# Patient Record
(demographics unavailable — no encounter records)

---

## 2025-01-23 NOTE — PROCEDURE
[Risks] : risks [Benefits] : benefits [Alternatives] : alternatives [Patient] : patient [Infection] : infection [Bleeding] : bleeding [Allergic Reaction] : allergic reaction [No Premedication] : no premedication [Pap Performed] : pap not performed [SCI Fully Visualized] : SCI not fully visualized [ECC Performed] : ECC performed [No Abnormalities] : no abnormalities [Biopsy] : biopsy not taken [Hemostasis Obtained] : Hemostasis obtained [Tolerated Well] : the patient tolerated the procedure well [de-identified] : 2 [de-identified] : Difficult visualization of cx due to a deep vagina. T-zone was not seen [de-identified] : 6 & 12:00 [de-identified] : Exocx [de-identified] : Adela [de-identified] : Findings d/w pt. No evidence of SHRUTHI - Path pending. No tampons, intercourse for 1 week.

## 2025-01-23 NOTE — PROCEDURE
[Risks] : risks [Benefits] : benefits [Alternatives] : alternatives [Patient] : patient [Infection] : infection [Bleeding] : bleeding [Allergic Reaction] : allergic reaction [No Premedication] : no premedication [Pap Performed] : pap not performed [SCI Fully Visualized] : SCI not fully visualized [ECC Performed] : ECC performed [No Abnormalities] : no abnormalities [Biopsy] : biopsy not taken [Hemostasis Obtained] : Hemostasis obtained [Tolerated Well] : the patient tolerated the procedure well [de-identified] : Difficult visualization of cx due to a deep vagina. T-zone was not seen [de-identified] : 2 [de-identified] : 6 & 12:00 [de-identified] : Exocx [de-identified] : Adela [de-identified] : Findings d/w pt. No evidence of SHRUTHI - Path pending. No tampons, intercourse for 1 week.

## 2025-02-08 NOTE — HISTORY OF PRESENT ILLNESS
[FreeTextEntry1] : 44yo P0  LMP 2/6/25 referred for new diagnosis of cervical adenocarcinoma. Patient reports a 10yr lapse in GYN care. States prior to recent GYN visit she believes last pap was WNL 10yrs ago. She admits to multiple sexual partners for part of this lapsed time period but over the last 6yrs has been in a monogamous relationship and is celibate. She has had HIV testing over the past year or two and reports WNL. She has no desire for fertility. Recent pap with HSIL/HPV16+, colpo with biopsies reveals adenocarcinoma with clear cell features. patient denies n/v/fever/bleeding/bloating. Reports normal urination and BMs. she was recently hospitalized multiple times at [facility] for complications following LSC Choley for acute on chronic cholecystitis 8/30/24.    PMHx: none PSHx: LSC choley 8/30/25  OBGYNHx: nullip, no hx of fibroids/cysts/abn paps/stis FamHx:Mother colon ca, DOD. no gyn ca, no breast ca SocHx: former heavy smoker. quit 15yrs ago All:none  11/11/24: pap = HSIL, HPV16+  Path: 1/23/25: cervical bx:  Final Diagnosis 1.  Cervix, biopsy      -   Adenocarcinoma with clear cell features.  See note 2.  Endocervical curettings      -   Adenocarcinoma with clear cell features.  See note Note: Part 1 and 2; immunohistochemical stain results of the tumor cells: P16 patchy positive, Napsin positive, P53 wild-type, P40 and ER negative The adenocarcinoma is present as small detached fragments with some stromal tissue in the endocervical curettings and cervical biopsy, with focal involvement of the epithelial surface of the endocervix. The origin of the adenocarcinoma cannot be determined with certainty whether from the cervix or endometrium with this limited materials   Imaging: pending

## 2025-02-08 NOTE — DISCUSSION/SUMMARY
[Reviewed Clinical Lab Test(s)] : Results of clinical tests were reviewed. [Discuss Alternatives/Risks/Benefits w/Patient] : All alternatives, risks, and benefits were discussed with the patient/family and all questions were answered.  Patient expressed good understanding and appreciates the importance of follow up as recommended. [Visit Time ___ Minutes] : [unfilled] minutes [Face to Face Time___ Minutes] : with [unfilled] minutes in face to face consultation. [FreeTextEntry1] : 42yo P0 w/ biopsy proven adenocarcinoma of the cervix. Clinical exam with no gross lesion. For further evaluation and treatment planning. -more than 50% of visit spent face to face with patient reviewing records and interpreting imaging/path/lab results, counseling and coordinating care with high level complexity -I reviewed diagnosis and natural hx of this condition. I reviewed association with HPV 16 and aggressive nature of this HPV strain. I reviewed standard of care treatment of cervical cancer and that more information is needed to guide treatment plan. I explained that treatment involves either surgical mgmt or mgmt with chemoRT. I recommended pelvic MRI and PETCT to assess extent of disease and then based on the findings we can then finalize treatment plan. all questions answered and patient expressed understanding -pelvic MRI, ordered by GYN, will f/u auth and scheduling -PETCT -pt started HPV vaccine series with GYN 1/2025. Intends to complete vaccination series. I explained it will not alter course of cancer diagnosis and treatment and may help suppress HPV16 and will protect her from other HPV strains. -televisit 1wk for results review and treatment planning -pain/fever/bleeding precautions given

## 2025-02-08 NOTE — PHYSICAL EXAM
[Chaperone Present] : A chaperone was present in the examining room during all aspects of the physical examination [75075] : A chaperone was present during the pelvic exam. [Fully active, able to carry on all pre-disease performance without restriction] : Status 0 - Fully active, able to carry on all pre-disease performance without restriction

## 2025-02-08 NOTE — PHYSICAL EXAM
[Chaperone Present] : A chaperone was present in the examining room during all aspects of the physical examination [98561] : A chaperone was present during the pelvic exam. [Fully active, able to carry on all pre-disease performance without restriction] : Status 0 - Fully active, able to carry on all pre-disease performance without restriction

## 2025-02-08 NOTE — HISTORY OF PRESENT ILLNESS
[FreeTextEntry1] : 42yo P0  LMP 2/6/25 referred for new diagnosis of cervical adenocarcinoma. Patient reports a 10yr lapse in GYN care. States prior to recent GYN visit she believes last pap was WNL 10yrs ago. She admits to multiple sexual partners for part of this lapsed time period but over the last 6yrs has been in a monogamous relationship and is celibate. She has had HIV testing over the past year or two and reports WNL. She has no desire for fertility. Recent pap with HSIL/HPV16+, colpo with biopsies reveals adenocarcinoma with clear cell features. patient denies n/v/fever/bleeding/bloating. Reports normal urination and BMs. she was recently hospitalized multiple times at [facility] for complications following LSC Choley for acute on chronic cholecystitis 8/30/24.    PMHx: none PSHx: LSC choley 8/30/25  OBGYNHx: nullip, no hx of fibroids/cysts/abn paps/stis FamHx:Mother colon ca, DOD. no gyn ca, no breast ca SocHx: former heavy smoker. quit 15yrs ago All:none  11/11/24: pap = HSIL, HPV16+  Path: 1/23/25: cervical bx:  Final Diagnosis 1.  Cervix, biopsy      -   Adenocarcinoma with clear cell features.  See note 2.  Endocervical curettings      -   Adenocarcinoma with clear cell features.  See note Note: Part 1 and 2; immunohistochemical stain results of the tumor cells: P16 patchy positive, Napsin positive, P53 wild-type, P40 and ER negative The adenocarcinoma is present as small detached fragments with some stromal tissue in the endocervical curettings and cervical biopsy, with focal involvement of the epithelial surface of the endocervix. The origin of the adenocarcinoma cannot be determined with certainty whether from the cervix or endometrium with this limited materials   Imaging: pending

## 2025-03-10 NOTE — ASSESSMENT
[FreeTextEntry1] : 44yo with newly diagnosed adenocarcinoma with clear cell features on cervical biopsy, unable to determine if cervical or endometrial in origin. MRI showed no abnormal lesions, and PET showed an FDG avid right external iliac lymph node with a negative biopsy.  We discussed that the next step would be to perform a CKC and D&C in order to obtain more tissue and definitively know whether this is cervical or endometrial in origin. In the setting of cervical adenocarcinoma, a CKC would also provide further information including presence/absence of LVSI and depth of invasion. We explained the importance of clarifying this, as the management options would differ (laparoscopic simple hysterectomy with SLNBx vs. open modified radical hysterectomy).   The risks and benefits of CKC were discussed which include, but are not limited to: bleeding, infection, cervical stenosis, cervical insufficiency. Possibility of needing a repeat procedure or hysterectomy was also discussed.   [] CKC, D&C at [facility] (concern for bleeding given cancer diagnosis) [x] Pre-op labs done 02/28/25 [] Medical clearance with pre-op testing in [place] [] Slide review [x] Review MRI images with [facility] radiology: Dr. Self agrees with report

## 2025-03-10 NOTE — ASSESSMENT
[FreeTextEntry1] : 42yo with newly diagnosed adenocarcinoma with clear cell features on cervical biopsy, unable to determine if cervical or endometrial in origin. MRI showed no abnormal lesions, and PET showed an FDG avid right external iliac lymph node with a negative biopsy.  We discussed that the next step would be to perform a CKC and D&C in order to obtain more tissue and definitively know whether this is cervical or endometrial in origin. In the setting of cervical adenocarcinoma, a CKC would also provide further information including presence/absence of LVSI and depth of invasion. We explained the importance of clarifying this, as the management options would differ (laparoscopic simple hysterectomy with SLNBx vs. open modified radical hysterectomy).   The risks and benefits of CKC were discussed which include, but are not limited to: bleeding, infection, cervical stenosis, cervical insufficiency. Possibility of needing a repeat procedure or hysterectomy was also discussed.   [] CKC, D&C at [facility] (concern for bleeding given cancer diagnosis) [x] Pre-op labs done 02/28/25 [] Medical clearance with pre-op testing in [place] [] Slide review [x] Review MRI images with [facility] radiology: Dr. Self agrees with report

## 2025-03-10 NOTE — REASON FOR VISIT
[FreeTextEntry1] : Follow-up  42yo w/ cervical cancer here for presurgical discussion. Feels well, denies pain or bleeding. LMP 03/02/25, menses regular. Not interested in future fertility. Had routine Pap smear showing HSIL HPV 16+, underwent colposcopic biopsies showing adneocarcinoma with clear cell features.   History: ObHx: Nulliparous GynHx: -Pap HSIL HPV 16 + 11/2024, previous Pap normal >10 years ago PMH: Denies PSH: Laparoscopic cholecystectomy 08/2024 Meds: Aspirin Allergies: NKDA SH: Ex-cigarette smoker, stopped 15 years. Denies alcohol, or illicit drug use. Lives with boyfriend. Not currently employed. FMH: Mother colon cancer late 50s.   Surveillance: Last mammogram: 2024 normal Last colonoscopy: 2024 normal

## 2025-03-10 NOTE — REASON FOR VISIT
[FreeTextEntry1] : Follow-up  44yo w/ cervical cancer here for presurgical discussion. Feels well, denies pain or bleeding. LMP 03/02/25, menses regular. Not interested in future fertility. Had routine Pap smear showing HSIL HPV 16+, underwent colposcopic biopsies showing adneocarcinoma with clear cell features.   History: ObHx: Nulliparous GynHx: -Pap HSIL HPV 16 + 11/2024, previous Pap normal >10 years ago PMH: Denies PSH: Laparoscopic cholecystectomy 08/2024 Meds: Aspirin Allergies: NKDA SH: Ex-cigarette smoker, stopped 15 years. Denies alcohol, or illicit drug use. Lives with boyfriend. Not currently employed. FMH: Mother colon cancer late 50s.   Surveillance: Last mammogram: 2024 normal Last colonoscopy: 2024 normal

## 2025-03-10 NOTE — HISTORY OF PRESENT ILLNESS
[FreeTextEntry1] : Problem: 1) Cervical adenocarcinoma   Previous Therapies: 11/11/24: pap = HSIL, HPV16+ 1/23/25: cervical bx, ECC:  Final Diagnosis 1.  Cervix, biopsy      -   Adenocarcinoma with clear cell features.  See note 2.  Endocervical curettings      -   Adenocarcinoma with clear cell features.  See note Note: Part 1 and 2; immunohistochemical stain results of the tumor cells: P16 patchy positive, Napsin positive, P53 wild-type, P40 and ER negative The adenocarcinoma is present as small detached fragments with some stromal tissue in the endocervical curettings and cervical biopsy, with focal involvement of the epithelial surface of the endocervix. The origin of the adenocarcinoma cannot be determined with certainty whether from the cervix or endometrium with this limited materials   2/14/25 Pelvic MRI              a) *  No cervical mass visualized at MRI. No evidence of parametrial invasion.              b) *  No pelvic lymphadenopathy.              c) *  Septate uterus.  2/14/25 PETCT              a) Hypermetabolic RIGHT external iliac node (1.2 x 1.1 cm, SUV 3.5)              b) Focus of uptake in the center of the cervix (SUV 6.5)  2/28/25 IR Biopsy of right inguinal LN             a) - Reactive lymph node with follicular hyperplasia             b) - Negative for lymphoma or metastatic carcinoma

## 2025-03-10 NOTE — PHYSICAL EXAM
[Chaperone Present] : A chaperone was present in the examining room during all aspects of the physical examination [Normal] : Bimanual Exam: Normal [Fully active, able to carry on all pre-disease performance without restriction] : Status 0 - Fully active, able to carry on all pre-disease performance without restriction [FreeTextEntry2] : Rosangeles Dylan

## 2025-03-10 NOTE — ASSESSMENT
[FreeTextEntry1] : 44yo with newly diagnosed adenocarcinoma with clear cell features on cervical biopsy, unable to determine if cervical or endometrial in origin. MRI showed no abnormal lesions, and PET showed an FDG avid right external iliac lymph node with a negative biopsy.  We discussed that the next step would be to perform a CKC and D&C in order to obtain more tissue and definitively know whether this is cervical or endometrial in origin. In the setting of cervical adenocarcinoma, a CKC would also provide further information including presence/absence of LVSI and depth of invasion. We explained the importance of clarifying this, as the management options would differ (laparoscopic simple hysterectomy with SLNBx vs. open modified radical hysterectomy).   The risks and benefits of CKC were discussed which include, but are not limited to: bleeding, infection, cervical stenosis, cervical insufficiency. Possibility of needing a repeat procedure or hysterectomy was also discussed.   [] CKC, D&C at [facility] (concern for bleeding given cancer diagnosis) [x] Pre-op labs done 02/28/25 [] Medical clearance with pre-op testing in [place] [] Slide review [x] Review MRI images with [facility] radiology: Dr. Self agrees with report

## 2025-03-13 NOTE — PHYSICAL EXAM
[No Acute Distress] : no acute distress [Well Nourished] : well nourished [Well Developed] : well developed [Well-Appearing] : well-appearing [Normal Sclera/Conjunctiva] : normal sclera/conjunctiva [PERRL] : pupils equal round and reactive to light [EOMI] : extraocular movements intact [Normal Outer Ear/Nose] : the outer ears and nose were normal in appearance [Normal Oropharynx] : the oropharynx was normal [No JVD] : no jugular venous distention [No Lymphadenopathy] : no lymphadenopathy [Supple] : supple [Thyroid Normal, No Nodules] : the thyroid was normal and there were no nodules present [No Respiratory Distress] : no respiratory distress  [No Accessory Muscle Use] : no accessory muscle use [Clear to Auscultation] : lungs were clear to auscultation bilaterally [Normal Rate] : normal rate  [Regular Rhythm] : with a regular rhythm [Normal S1, S2] : normal S1 and S2 [No Murmur] : no murmur heard [Soft] : abdomen soft [Non Tender] : non-tender [Non-distended] : non-distended [No Masses] : no abdominal mass palpated [No HSM] : no HSM [Normal Bowel Sounds] : normal bowel sounds [Normal Posterior Cervical Nodes] : no posterior cervical lymphadenopathy [Normal Anterior Cervical Nodes] : no anterior cervical lymphadenopathy [No Joint Swelling] : no joint swelling [Grossly Normal Strength/Tone] : grossly normal strength/tone [No Rash] : no rash [Coordination Grossly Intact] : coordination grossly intact [No Focal Deficits] : no focal deficits [Normal Gait] : normal gait [Normal Affect] : the affect was normal [Normal Insight/Judgement] : insight and judgment were intact [Normal TMs] : both tympanic membranes were normal

## 2025-03-17 NOTE — HISTORY OF PRESENT ILLNESS
[No Pertinent Cardiac History] : no history of aortic stenosis, atrial fibrillation, coronary artery disease, recent myocardial infarction, or implantable device/pacemaker [No Pertinent Pulmonary History] : no history of asthma, COPD, sleep apnea, or smoking [No Adverse Anesthesia Reaction] : no adverse anesthesia reaction in self or family member [(Patient denies any chest pain, claudication, dyspnea on exertion, orthopnea, palpitations or syncope)] : Patient denies any chest pain, claudication, dyspnea on exertion, orthopnea, palpitations or syncope [Moderate (4-6 METs)] : Moderate (4-6 METs) [Smoker] : not a smoker [FreeTextEntry1] : cone biopsy [FreeTextEntry2] : 3/20/25 [FreeTextEntry3] : Dr. Britt Koenig [FreeTextEntry4] : Pt is a 42 yo F presenting for pre op examination  Pt has hx of cervical adenocarcinoma  Cervical cone bx upcoming  Previous surgery includes gallbladder surgery Past surgical complications: None  Past anesthetic complications: None Any personal or family history of bleeding problems: none  [FreeTextEntry5] : Patient was a former smoker however quit many years ago

## 2025-03-17 NOTE — HISTORY OF PRESENT ILLNESS
[No Pertinent Cardiac History] : no history of aortic stenosis, atrial fibrillation, coronary artery disease, recent myocardial infarction, or implantable device/pacemaker [No Pertinent Pulmonary History] : no history of asthma, COPD, sleep apnea, or smoking [No Adverse Anesthesia Reaction] : no adverse anesthesia reaction in self or family member [(Patient denies any chest pain, claudication, dyspnea on exertion, orthopnea, palpitations or syncope)] : Patient denies any chest pain, claudication, dyspnea on exertion, orthopnea, palpitations or syncope [Moderate (4-6 METs)] : Moderate (4-6 METs) [Smoker] : not a smoker [FreeTextEntry1] : cone biopsy [FreeTextEntry2] : 3/20/25 [FreeTextEntry3] : Dr. Britt Koenig [FreeTextEntry4] : Pt is a 44 yo F presenting for pre op examination  Pt has hx of cervical adenocarcinoma  Cervical cone bx upcoming  Previous surgery includes gallbladder surgery Past surgical complications: None  Past anesthetic complications: None Any personal or family history of bleeding problems: none  [FreeTextEntry5] : Patient was a former smoker however quit many years ago

## 2025-03-17 NOTE — ADDENDUM
[FreeTextEntry1] : Reviewed lab results discussed also with surgeon.  Patient is medically optimized to undergo cone biopsy procedure.

## 2025-04-22 NOTE — ASSESSMENT
[FreeTextEntry1] : I discussed with the patient with the aid of diagrams, reviewed the findings on history and physical examination, pathology results and reviewed available imaging studies in detail. 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  The management of women with adenocarcinoma in situ (AIS) of the cervix is challenging. Because of the pattern of disease distribution of AIS (multifocal, high in the endocervical canal, inside endocervical clefts), negative margins on a cone biopsy specimen or a negative endocervical curettage do not necessarily ensure that the lesion has been completely excised. This also makes it difficult to detect persistent/recurrent disease or adenocarcinoma. For the reason, NCCN guidelines recommend hysterectomy for patients who have completed childbearing. For patients desiring fertility, conization followed by close surveillance is acceptable. It was explained that women treated with conization alone have a high risk of residual AIS or adenocarcinoma, while the incidence of adenocarcinoma of the vagina after hysterectomy is limited to rare case reports.  The best available data regarding the risk of residual AIS or adenocarcinoma following conization are from a meta-analysis of 33 observational studies including 1278 patients who were followed for an average of three years (range, one to five years) [6]. Among 60 patients who underwent subsequent conization or hysterectomy, the rates of residual AIS and adenocarcinoma were: initial conization margin positive (AIS: 52.8 percent; adenocarcinoma: 6.3 percent [17 of 266]) and initial conization negative (20.3 percent; 1.5 percent [4 of 341]). Adenocarcinoma was diagnosed in an additional eight women (six with positive margins; two with negative margins) who were managed conservatively. The clinical importance of residual AIS is that it is likely to develop into invasive disease. However, progression may take five or more years and long-term data are lacking  Given these considerations, for women with cervical AIS who do not wish to preserve fertility, I recommend cervical conization followed in 8-12 weeks by a simple hysterectomy rather than cervical conization alone. In cases of non-invasive tumor, or invasive stage IA1 disease a simple hysterectomy via a minimally invasive approach can be offered. Preservation of the ovaries in younger women is appropriate, however removal of the fallopian tubes is recommended.  After discussion, the patient states she does not desire future fertility and would like to pursue definitive management of AIS with a hysterectomy and bilateral salpingectomy.  Complications that include, but are not limited to: bleeding, infection, injury to other organs including bowel, bladder, ureters, blood vessels, nerves, infections, blood clots, lymphedema, pneumonia, wound complications and prolonged hospital stay have all been discussed with the patient. I have also provided her with the diagrams  [] [facility] [] Medical clearance from 03/13/25 to be updated for a hysterectomy [] Pre-op labs done 03/13/25

## 2025-04-22 NOTE — PHYSICAL EXAM
[Chaperone Present] : A chaperone was present in the examining room during all aspects of the physical examination [Normal] : Bimanual Exam: Normal [FreeTextEntry2] : Rosangeles Dylan [de-identified] : cervix well-healed

## 2025-04-22 NOTE — PHYSICAL EXAM
[Chaperone Present] : A chaperone was present in the examining room during all aspects of the physical examination [Normal] : Bimanual Exam: Normal [FreeTextEntry2] : Rosangeles Dylan [de-identified] : cervix well-healed

## 2025-04-22 NOTE — ASSESSMENT
[FreeTextEntry1] : I discussed with the patient with the aid of diagrams, reviewed the findings on history and physical examination, pathology results and reviewed available imaging studies in detail. 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  The management of women with adenocarcinoma in situ (AIS) of the cervix is challenging. Because of the pattern of disease distribution of AIS (multifocal, high in the endocervical canal, inside endocervical clefts), negative margins on a cone biopsy specimen or a negative endocervical curettage do not necessarily ensure that the lesion has been completely excised. This also makes it difficult to detect persistent/recurrent disease or adenocarcinoma. For the reason, NCCN guidelines recommend hysterectomy for patients who have completed childbearing. For patients desiring fertility, conization followed by close surveillance is acceptable. It was explained that women treated with conization alone have a high risk of residual AIS or adenocarcinoma, while the incidence of adenocarcinoma of the vagina after hysterectomy is limited to rare case reports.  The best available data regarding the risk of residual AIS or adenocarcinoma following conization are from a meta-analysis of 33 observational studies including 1278 patients who were followed for an average of three years (range, one to five years) [6]. Among 60 patients who underwent subsequent conization or hysterectomy, the rates of residual AIS and adenocarcinoma were: initial conization margin positive (AIS: 52.8 percent; adenocarcinoma: 6.3 percent [17 of 266]) and initial conization negative (20.3 percent; 1.5 percent [4 of 341]). Adenocarcinoma was diagnosed in an additional eight women (six with positive margins; two with negative margins) who were managed conservatively. The clinical importance of residual AIS is that it is likely to develop into invasive disease. However, progression may take five or more years and long-term data are lacking  Given these considerations, for women with cervical AIS who do not wish to preserve fertility, I recommend cervical conization followed in 8-12 weeks by a simple hysterectomy rather than cervical conization alone. In cases of non-invasive tumor, or invasive stage IA1 disease a simple hysterectomy via a minimally invasive approach can be offered. Preservation of the ovaries in younger women is appropriate, however removal of the fallopian tubes is recommended.  After discussion, the patient states she does not desire future fertility and would like to pursue definitive management of AIS with a hysterectomy and bilateral salpingectomy.  Complications that include, but are not limited to: bleeding, infection, injury to other organs including bowel, bladder, ureters, blood vessels, nerves, infections, blood clots, lymphedema, pneumonia, wound complications and prolonged hospital stay have all been discussed with the patient. I have also provided her with the diagrams  [] [facility] BS [] Medical clearance from 03/13/25 to be updated for a hysterectomy [] Pre-op labs done 03/13/25

## 2025-04-22 NOTE — HISTORY OF PRESENT ILLNESS
[FreeTextEntry1] : Problem: 1) Cervical AIS   Previous Therapies: 11/11/24: pap = HSIL, HPV16+ 1/23/25: cervical bx, ECC:  Final Diagnosis 1.  Cervix, biopsy      -   Adenocarcinoma with clear cell features.  See note 2.  Endocervical curettings      -   Adenocarcinoma with clear cell features.  See note Note: Part 1 and 2; immunohistochemical stain results of the tumor cells: P16 patchy positive, Napsin positive, P53 wild-type, P40 and ER negative The adenocarcinoma is present as small detached fragments with some stromal tissue in the endocervical curettings and cervical biopsy, with focal involvement of the epithelial surface of the endocervix. The origin of the adenocarcinoma cannot be determined with certainty whether from the cervix or endometrium with this limited materials   2/14/25 Pelvic MRI              a) *  No cervical mass visualized at MRI. No evidence of parametrial invasion.              b) *  No pelvic lymphadenopathy.              c) *  Septate uterus.  2/14/25 PETCT              a) Hypermetabolic RIGHT external iliac node (1.2 x 1.1 cm, SUV 3.5)              b) Focus of uptake in the center of the cervix (SUV 6.5)  2/28/25 IR Biopsy of right inguinal LN             a) - Reactive lymph node with follicular hyperplasia             b) - Negative for lymphoma or metastatic carcinoma  1) CKC 3/20/25     a) AIS with positive endocervical margins (clear cell features, but not clear cell carcinoma)

## 2025-04-22 NOTE — REASON FOR VISIT
[FreeTextEntry1] : Postop  44yo w/ cervical cancer s/p [facility] here for postop. Patient feels well, denies any issues after the procedure. Had her menses 1 week afterwards with associated lower pelvic cramping. Denies pain today.   History: ObHx: Nulliparous GynHx: -Pap HSIL HPV 16 + 11/2024, previous Pap normal >10 years ago PMH: Denies PSH: Laparoscopic cholecystectomy 08/2024 Meds: Aspirin Allergies: NKDA SH: Ex-cigarette smoker, stopped 15 years. Denies alcohol, or illicit drug use. Lives with boyfriend. Not currently employed. FMH: Mother colon cancer late 50s.   Surveillance: Last mammogram: 2024 normal Last colonoscopy: 2024 normal

## 2025-04-22 NOTE — REASON FOR VISIT
[FreeTextEntry1] : Postop  42yo w/ cervical cancer s/p [facility] here for postop. Patient feels well, denies any issues after the procedure. Had her menses 1 week afterwards with associated lower pelvic cramping. Denies pain today.   History: ObHx: Nulliparous GynHx: -Pap HSIL HPV 16 + 11/2024, previous Pap normal >10 years ago PMH: Denies PSH: Laparoscopic cholecystectomy 08/2024 Meds: Aspirin Allergies: NKDA SH: Ex-cigarette smoker, stopped 15 years. Denies alcohol, or illicit drug use. Lives with boyfriend. Not currently employed. FMH: Mother colon cancer late 50s.   Surveillance: Last mammogram: 2024 normal Last colonoscopy: 2024 normal

## 2025-05-08 NOTE — REASON FOR VISIT
[Other Location: e.g. School (Enter Location, City,State)___] : at [unfilled], at the time of the visit. [Medical Office: ([facility])___] : at the medical office located in  [Telehealth (audio & video)] : This visit was provided via telehealth using real-time 2-way audio visual technology. [Verbal consent obtained from patient] : the patient, [unfilled] [FreeTextEntry1] : Medical Clearance Update   44yo w/ cervical cancer s/p [facility] here for update of medical clearance for [facility] BS 5/15/25 for definitive management of AIS.   History: ObHx: Nulliparous GynHx: -Pap HSIL HPV 16 + 11/2024, previous Pap normal >10 years ago PMH: Denies PSH: Laparoscopic cholecystectomy 08/2024 Meds: Aspirin Allergies: NKDA SH: Ex-cigarette smoker, stopped 15 years. Denies alcohol, or illicit drug use. Lives with boyfriend. Not currently employed. FMH: Mother colon cancer late 50s.   Surveillance: Last mammogram: 2024 normal Last colonoscopy: 2024 normal

## 2025-05-08 NOTE — ASSESSMENT
[FreeTextEntry1] : Patient was scheduled today to discuss her upcoming surgery.  -	Indications for the surgery and risks, alternative and benefits were once again discussed in detail. She had an opportunity to ask questions. -	Her medical clearance form was reviewed and found to be appropriate: clearance from March was low-risk for surgery. She has had no significant interval changes in her health and is therefore considered medically optimized for her procedure -	Her current medications were reviewed, and no medications need to be held pre-operatively.  -	Labs were reviewed. -	Pre-operative imaging: MRI and PET reviewed -	Pathology review: not needed  The pre-operative booklet was reviewed so that she would understand where to find important instructions for the day before and the day of surgery.

## 2025-05-08 NOTE — REASON FOR VISIT
[Other Location: e.g. School (Enter Location, City,State)___] : at [unfilled], at the time of the visit. [Medical Office: ([facility])___] : at the medical office located in  [Telehealth (audio & video)] : This visit was provided via telehealth using real-time 2-way audio visual technology. [Verbal consent obtained from patient] : the patient, [unfilled] [FreeTextEntry1] : Medical Clearance Update   44yo w/ cervical cancer s/p [facility] here for update of medical clearance for [facility] BS 5/15/25 for definitive management of AIS.   History: ObHx: Nulliparous GynHx: -Pap HSIL HPV 16 + 11/2024, previous Pap normal >10 years ago PMH: Denies PSH: Laparoscopic cholecystectomy 08/2024 Meds: Aspirin Allergies: NKDA SH: Ex-cigarette smoker, stopped 15 years. Denies alcohol, or illicit drug use. Lives with boyfriend. Not currently employed. FMH: Mother colon cancer late 50s.   Surveillance: Last mammogram: 2024 normal Last colonoscopy: 2024 normal

## 2025-05-08 NOTE — HISTORY OF PRESENT ILLNESS
[FreeTextEntry1] : Problem: 1) Cervical AIS   Previous Therapies: 11/11/24: pap = HSIL, HPV16+ 1/23/25: cervical bx, ECC:  Final Diagnosis 1.  Cervix, biopsy      -   Adenocarcinoma with clear cell features.  See note 2.  Endocervical curettings      -   Adenocarcinoma with clear cell features.  See note Note: Part 1 and 2; immunohistochemical stain results of the tumor cells: P16 patchy positive, Napsin positive, P53 wild-type, P40 and ER negative The adenocarcinoma is present as small detached fragments with some stromal tissue in the endocervical curettings and cervical biopsy, with focal involvement of the epithelial surface of the endocervix. The origin of the adenocarcinoma cannot be determined with certainty whether from the cervix or endometrium with this limited materials   2/14/25 Pelvic MRI              a) *  No cervical mass visualized at MRI. No evidence of parametrial invasion.              b) *  No pelvic lymphadenopathy.              c) *  Septate uterus.  2/14/25 PETCT              a) Hypermetabolic RIGHT external iliac node (1.2 x 1.1 cm, SUV 3.5)              b) Focus of uptake in the center of the cervix (SUV 6.5)  2/28/25 IR Biopsy of right inguinal LN             a) - Reactive lymph node with follicular hyperplasia             b) - Negative for lymphoma or metastatic carcinoma CKC 3/20/25     a) AIS with positive endocervical margins (clear cell features, but not clear cell carcinoma)

## 2025-05-22 NOTE — HISTORY OF PRESENT ILLNESS
[FreeTextEntry1] : Problem: 1) Cervical AIS   Previous Therapies: 11/11/24: pap = HSIL, HPV16+ 1/23/25: cervical bx, ECC:  Final Diagnosis 1.  Cervix, biopsy      -   Adenocarcinoma with clear cell features.  See note 2.  Endocervical curettings      -   Adenocarcinoma with clear cell features.  See note Note: Part 1 and 2; immunohistochemical stain results of the tumor cells: P16 patchy positive, Napsin positive, P53 wild-type, P40 and ER negative The adenocarcinoma is present as small detached fragments with some stromal tissue in the endocervical curettings and cervical biopsy, with focal involvement of the epithelial surface of the endocervix. The origin of the adenocarcinoma cannot be determined with certainty whether from the cervix or endometrium with this limited materials   2/14/25 Pelvic MRI              a) *  No cervical mass visualized at MRI. No evidence of parametrial invasion.              b) *  No pelvic lymphadenopathy.              c) *  Septate uterus.  2/14/25 PETCT              a) Hypermetabolic RIGHT external iliac node (1.2 x 1.1 cm, SUV 3.5)              b) Focus of uptake in the center of the cervix (SUV 6.5)  2/28/25 IR Biopsy of right inguinal LN             a) - Reactive lymph node with follicular hyperplasia             b) - Negative for lymphoma or metastatic carcinoma CKC 3/20/25     a) AIS with positive endocervical margins (clear cell features, but not clear cell carcinoma)  1) S/P [facility], BS, Cystoscopy 5/15/25     a) Uterus, cervix, bilateral fallopian tubes Cervix: - No residual endocervical adenocarcinoma identified; see note Note: The cervical epithelium is negative for p16 and shows normal Ki-67 expression by IHC (blocks 1A, 1G and 1I), findings supportive of the diagnosis. Entire cervix has been examined microscopically, in a clockwise fashion. Corpus: - Adenomyosis, focal - Leiomyomata - Secretory endometrium, day 18 Fallopian tube, 1: - No pathological diagnosis Fallopian tube, 2: - No pathological diagnosis

## 2025-05-22 NOTE — REASON FOR VISIT
[Other Location: e.g. School (Enter Location, City,State)___] : at [unfilled], at the time of the visit. [Medical Office: ([facility])___] : at the medical office located in  [Telehealth (audio & video)] : This visit was provided via telehealth using real-time 2-way audio visual technology. [Verbal consent obtained from patient] : the patient, [unfilled] [FreeTextEntry1] : 1 Week post-op  44yo s/p [facility], BS, cystoscopy here for 1 week postop visit. Pain controlled. + Bms. Pathology discussed.    History: ObHx: Nulliparous GynHx: -Pap HSIL HPV 16 + 11/2024, previous Pap normal >10 years ago PMH: Denies PSH: Laparoscopic cholecystectomy 08/2024 Meds: Aspirin Allergies: NKDA SH: Ex-cigarette smoker, stopped 15 years. Denies alcohol, or illicit drug use. Lives with boyfriend. Not currently employed. FMH: Mother colon cancer late 50s.   Surveillance: Last mammogram: 2024 normal Last colonoscopy: 2024 normal left ear pain

## 2025-05-22 NOTE — ASSESSMENT
[FreeTextEntry1] : S/P [facility], BS, Cystoscopy  Meeting postop milestones Benign pathology  [] post-op precautions given [] f/u with Dr. Koenig in 3 weeks

## 2025-06-04 NOTE — REASON FOR VISIT
[FreeTextEntry1] : 1 Month post-op  44yo s/p [facility], BS, cystoscopy here for 1 Month postop visit.  Patient is doing well. She shared that the hysterectomy was an easier recovery than the [facility] had been.  History: ObHx: Nulliparous GynHx: -Pap HSIL HPV 16 + 11/2024, previous Pap normal >10 years ago PMH: Denies PSH: Laparoscopic cholecystectomy 08/2024 Meds: Aspirin Allergies: NKDA SH: Ex-cigarette smoker, stopped 15 years. Denies alcohol, or illicit drug use. Lives with boyfriend. Not currently employed. FMH: Mother colon cancer late 50s.   Surveillance: Last mammogram: 2024 normal Last colonoscopy: 2024 normal

## 2025-06-04 NOTE — ASSESSMENT
[FreeTextEntry1] : Appropriate 1 month recovery. Patient cleared for normal activity. Recommend 8 weeks total before intercourse.  Pathology reviewed in detail.  Recommendation for annual vaginal PAP co-test explained.  She will follow up with her gyn in [place]  Reconsult as needed.

## 2025-06-04 NOTE — PHYSICAL EXAM
[Chaperoned Physical Exam] : A chaperone was present in the examining room during all aspects of the physical examination. [MA] : MA [FreeTextEntry2] : Rosangeles Dylan [Absent] : Adnexa(ae): Absent [Normal] : Anus and perineum: Normal sphincter tone, no masses, no prolapse.

## 2025-06-04 NOTE — ASSESSMENT
[FreeTextEntry1] : Appropriate 1 month recovery. Patient cleared for normal activity. Recommend 8 weeks total before intercourse.  Pathology reviewed in detail.  Recommendation for annual vaginal PAP co-test explained.  She will follow up with her gyn in [place]  Reconsult as needed.

## 2025-06-04 NOTE — REASON FOR VISIT
[FreeTextEntry1] : 1 Month post-op  42yo s/p [facility], BS, cystoscopy here for 1 Month postop visit.  Patient is doing well. She shared that the hysterectomy was an easier recovery than the [facility] had been.  History: ObHx: Nulliparous GynHx: -Pap HSIL HPV 16 + 11/2024, previous Pap normal >10 years ago PMH: Denies PSH: Laparoscopic cholecystectomy 08/2024 Meds: Aspirin Allergies: NKDA SH: Ex-cigarette smoker, stopped 15 years. Denies alcohol, or illicit drug use. Lives with boyfriend. Not currently employed. FMH: Mother colon cancer late 50s.   Surveillance: Last mammogram: 2024 normal Last colonoscopy: 2024 normal

## 2025-06-04 NOTE — ASSESSMENT
[FreeTextEntry1] : Appropriate 1 month recovery. Patient cleared for normal activity. Recommend 8 weeks total before intercourse.  Pathology reviewed in detail.  Recommendation for annual vaginal PAP co-test explained.  She will follow up with her gyn in [place]  Reconsult as needed.

## 2025-06-04 NOTE — REASON FOR VISIT
[FreeTextEntry1] : 1 Month post-op  42yo s/p [facility], BS, cystoscopy here for 1 Month postop visit.  Patient is doing well. She shared that the hysterectomy was an easier recovery than the [facility] had been.  History: ObHx: Nulliparous GynHx: -Pap HSIL HPV 16 + 11/2024, previous Pap normal >10 years ago PMH: Denies PSH: Laparoscopic cholecystectomy 08/2024 Meds: Aspirin Allergies: NKDA SH: Ex-cigarette smoker, stopped 15 years. Denies alcohol, or illicit drug use. Lives with boyfriend. Not currently employed. FMH: Mother colon cancer late 50s.   Surveillance: Last mammogram: 2024 normal Last colonoscopy: 2024 normal